# Patient Record
Sex: MALE | Race: WHITE | NOT HISPANIC OR LATINO | URBAN - METROPOLITAN AREA
[De-identification: names, ages, dates, MRNs, and addresses within clinical notes are randomized per-mention and may not be internally consistent; named-entity substitution may affect disease eponyms.]

---

## 2023-08-14 ENCOUNTER — HOSPITAL ENCOUNTER (OUTPATIENT)
Dept: DATA CONVERSION | Facility: HOSPITAL | Age: 28
Discharge: HOME | End: 2023-08-14

## 2023-08-14 DIAGNOSIS — Z00.00 ENCOUNTER FOR GENERAL ADULT MEDICAL EXAMINATION WITHOUT ABNORMAL FINDINGS: ICD-10-CM

## 2023-08-14 DIAGNOSIS — E66.9 OBESITY, UNSPECIFIED: ICD-10-CM

## 2023-08-14 LAB
ALBUMIN SERPL-MCNC: 4.4 GM/DL (ref 3.5–5)
ALBUMIN/GLOB SERPL: 1.5 RATIO (ref 1.5–3)
ALP BLD-CCNC: 51 U/L (ref 35–125)
ALT SERPL-CCNC: 69 U/L (ref 5–40)
ANION GAP SERPL CALCULATED.3IONS-SCNC: 12 MMOL/L (ref 0–19)
APPEARANCE PLAS: CLEAR
AST SERPL-CCNC: 38 U/L (ref 5–40)
BASOPHILS # BLD AUTO: 0.04 K/UL (ref 0–0.22)
BASOPHILS NFR BLD AUTO: 0.7 % (ref 0–1)
BILIRUB SERPL-MCNC: 0.7 MG/DL (ref 0.1–1.2)
BUN SERPL-MCNC: 17 MG/DL (ref 8–25)
BUN/CREAT SERPL: 15.5 RATIO (ref 8–21)
CALCIUM SERPL-MCNC: 9.8 MG/DL (ref 8.5–10.4)
CHLORIDE SERPL-SCNC: 105 MMOL/L (ref 97–107)
CHOLEST SERPL-MCNC: 210 MG/DL (ref 133–200)
CHOLEST/HDLC SERPL: 6.2 RATIO
CO2 SERPL-SCNC: 23 MMOL/L (ref 24–31)
COLOR SPUN FLD: YELLOW
CREAT SERPL-MCNC: 1.1 MG/DL (ref 0.4–1.6)
DEPRECATED RDW RBC AUTO: 43.2 FL (ref 37–54)
DIFFERENTIAL METHOD BLD: ABNORMAL
EOSINOPHIL # BLD AUTO: 0.15 K/UL (ref 0–0.45)
EOSINOPHIL NFR BLD: 2.5 % (ref 0–3)
ERYTHROCYTE [DISTWIDTH] IN BLOOD BY AUTOMATED COUNT: 13.1 % (ref 11.7–15)
FASTING STATUS PATIENT QL REPORTED: ABNORMAL
GFR SERPL CREATININE-BSD FRML MDRD: 93 ML/MIN/1.73 M2
GLOBULIN SER-MCNC: 3 G/DL (ref 1.9–3.7)
GLUCOSE SERPL-MCNC: 92 MG/DL (ref 65–99)
HCT VFR BLD AUTO: 43.6 % (ref 41–50)
HDLC SERPL-MCNC: 34 MG/DL
HGB BLD-MCNC: 14.1 GM/DL (ref 13.5–16.5)
IMM GRANULOCYTES # BLD AUTO: 0.1 K/UL (ref 0–0.1)
LDLC SERPL CALC-MCNC: 130 MG/DL (ref 65–130)
LYMPHOCYTES # BLD AUTO: 1.7 K/UL (ref 1.2–3.2)
LYMPHOCYTES NFR BLD MANUAL: 28.6 % (ref 20–40)
MCH RBC QN AUTO: 29.3 PG (ref 26–34)
MCHC RBC AUTO-ENTMCNC: 32.3 % (ref 31–37)
MCV RBC AUTO: 90.5 FL (ref 80–100)
MONOCYTES # BLD AUTO: 0.38 K/UL (ref 0–0.8)
MONOCYTES NFR BLD MANUAL: 6.4 % (ref 0–8)
NEUTROPHILS # BLD AUTO: 3.57 K/UL
NEUTROPHILS # BLD AUTO: 3.57 K/UL (ref 1.8–7.7)
NEUTROPHILS.IMMATURE NFR BLD: 1.7 % (ref 0–1)
NEUTS SEG NFR BLD: 60.1 % (ref 50–70)
NRBC BLD-RTO: 0 /100 WBC
PLATELET # BLD AUTO: 219 K/UL (ref 150–450)
PMV BLD AUTO: 12.5 CU (ref 7–12.6)
POTASSIUM SERPL-SCNC: 4.8 MMOL/L (ref 3.4–5.1)
PROT SERPL-MCNC: 7.4 G/DL (ref 5.9–7.9)
RBC # BLD AUTO: 4.82 M/UL (ref 4.5–5.5)
SODIUM SERPL-SCNC: 140 MMOL/L (ref 133–145)
TRIGL SERPL-MCNC: 230 MG/DL (ref 40–150)
TSH SERPL DL<=0.05 MIU/L-ACNC: 2.53 MIU/L (ref 0.27–4.2)
WBC # BLD AUTO: 5.9 K/UL (ref 4.5–11)

## 2023-09-16 VITALS
WEIGHT: 315 LBS | HEIGHT: 72 IN | SYSTOLIC BLOOD PRESSURE: 132 MMHG | HEART RATE: 82 BPM | TEMPERATURE: 98.1 F | RESPIRATION RATE: 18 BRPM | BODY MASS INDEX: 42.66 KG/M2 | OXYGEN SATURATION: 97 % | DIASTOLIC BLOOD PRESSURE: 84 MMHG

## 2024-08-26 ENCOUNTER — HOSPITAL ENCOUNTER (OUTPATIENT)
Dept: RADIOLOGY | Facility: EXTERNAL LOCATION | Age: 29
Discharge: HOME | End: 2024-08-26

## 2024-08-26 DIAGNOSIS — M79.672 LEFT FOOT PAIN: ICD-10-CM

## 2024-12-30 ENCOUNTER — OFFICE VISIT (OUTPATIENT)
Dept: URGENT CARE | Age: 29
End: 2024-12-30
Payer: COMMERCIAL

## 2024-12-30 ENCOUNTER — ANCILLARY PROCEDURE (OUTPATIENT)
Dept: URGENT CARE | Age: 29
End: 2024-12-30
Payer: COMMERCIAL

## 2024-12-30 VITALS
HEIGHT: 72 IN | SYSTOLIC BLOOD PRESSURE: 119 MMHG | OXYGEN SATURATION: 97 % | BODY MASS INDEX: 42.66 KG/M2 | WEIGHT: 315 LBS | TEMPERATURE: 98.5 F | DIASTOLIC BLOOD PRESSURE: 77 MMHG | HEART RATE: 110 BPM

## 2024-12-30 DIAGNOSIS — R68.89 FLU-LIKE SYMPTOMS: ICD-10-CM

## 2024-12-30 DIAGNOSIS — R05.9 COUGH, UNSPECIFIED TYPE: ICD-10-CM

## 2024-12-30 DIAGNOSIS — J02.9 ACUTE SORE THROAT: ICD-10-CM

## 2024-12-30 LAB
POC RAPID INFLUENZA A: NEGATIVE
POC RAPID INFLUENZA B: NEGATIVE
POC RAPID STREP: NEGATIVE
POC RSV PCR: NOT DETECTED
POC SARS-COV-2 AG BINAX: NORMAL

## 2024-12-30 PROCEDURE — 99213 OFFICE O/P EST LOW 20 MIN: CPT

## 2024-12-30 PROCEDURE — 71046 X-RAY EXAM CHEST 2 VIEWS: CPT

## 2024-12-30 PROCEDURE — 87807 RSV ASSAY W/OPTIC: CPT

## 2024-12-30 PROCEDURE — 3008F BODY MASS INDEX DOCD: CPT

## 2024-12-30 PROCEDURE — 87804 INFLUENZA ASSAY W/OPTIC: CPT

## 2024-12-30 PROCEDURE — 87811 SARS-COV-2 COVID19 W/OPTIC: CPT

## 2024-12-30 PROCEDURE — 87880 STREP A ASSAY W/OPTIC: CPT

## 2024-12-30 ASSESSMENT — ENCOUNTER SYMPTOMS
HEADACHES: 1
COUGH: 1
FEVER: 1
FATIGUE: 1

## 2024-12-30 NOTE — PROGRESS NOTES
Subjective   Patient ID: Jose J Hilliard is a 29 y.o. male. They present today with a chief complaint of Chest Pain, Headache, Cough (Symptoms started yesterday and are getting worse. Has baby at home and wants to make sure he's okay/Swabbing for strep, covid, and flu), and Fever.    History of Present Illness  Patient is a 29-year-old male with no pertinent medical history presents urgent with a complaint of flulike symptoms.  Specifically he endorses fever, chest congestion, productive cough, and bodies.  His symptoms started yesterday.  He has been using over-the-counter medication with some relief.  He denies any nausea, vomitting, chest pain or shortness of breath.  No other complaints mentioned at this time.       History provided by:  Patient  Chest Pain  Associated symptoms: cough, fatigue, fever and headache    Headache  Associated symptoms: congestion, cough, fatigue and fever    Cough  Associated symptoms include a fever and headaches.   Fever   Associated symptoms include congestion, coughing and headaches.       Past Medical History  Allergies as of 12/30/2024    (No Known Allergies)       (Not in a hospital admission)         No past medical history on file.    No past surgical history on file.         Review of Systems  Review of Systems   Constitutional:  Positive for fatigue and fever.   HENT:  Positive for congestion.    Respiratory:  Positive for cough.    Neurological:  Positive for headaches.                                  Objective    Vitals:    12/30/24 0818   BP: 119/77   Pulse: 110   Temp: 36.9 °C (98.5 °F)   TempSrc: Temporal   SpO2: 97%   Weight: 150 kg (330 lb)   Height: 1.829 m (6')     No LMP for male patient.    Physical Exam  Vitals and nursing note reviewed.   Constitutional:       General: He is not in acute distress.     Appearance: Normal appearance. He is not ill-appearing, toxic-appearing or diaphoretic.   HENT:      Head: Normocephalic and atraumatic.      Right Ear:  Tympanic membrane, ear canal and external ear normal.      Left Ear: Tympanic membrane, ear canal and external ear normal.      Nose: Congestion present.      Mouth/Throat:      Mouth: Mucous membranes are moist.      Pharynx: Posterior oropharyngeal erythema present. No oropharyngeal exudate.   Eyes:      Extraocular Movements: Extraocular movements intact.      Conjunctiva/sclera: Conjunctivae normal.      Pupils: Pupils are equal, round, and reactive to light.   Cardiovascular:      Rate and Rhythm: Normal rate and regular rhythm.      Pulses: Normal pulses.      Heart sounds: Normal heart sounds.   Pulmonary:      Effort: Pulmonary effort is normal. No respiratory distress.      Breath sounds: Normal breath sounds. No stridor. No wheezing, rhonchi or rales.   Chest:      Chest wall: No tenderness.   Musculoskeletal:         General: Normal range of motion.      Cervical back: Normal range of motion and neck supple.   Skin:     General: Skin is warm and dry.      Capillary Refill: Capillary refill takes less than 2 seconds.   Neurological:      General: No focal deficit present.      Mental Status: He is alert and oriented to person, place, and time.   Psychiatric:         Mood and Affect: Mood normal.         Behavior: Behavior normal.         Procedures      Assessment/Plan   Allergies, medications, history, and pertinent labs/EKGs/Imaging reviewed by MEGAN Mota.     Medical Decision Making  Patient is well appearing, afebrile, non toxic, not hypoxic, and appropriate for outpatient treatment and management at time of evaluation.   Patient presents with flulike symptoms as described above.    Differential includes but not limited to: COVID, influenza, pneumonia, URI, strep throat, other.      Rapid flu, strep, RSV and COVID are negative.  Chest x-ray ordered.  Image independently reviewed by myself and interpreted as no acute cardiopulmonary process.     At this time, I feel symptoms are most  likely secondary to an upper respiratory infection, viral in nature.  Recommended continued use of over-the-counter medication as needed for relief and close follow-up with PCP.  ER cautions and red flags discussed.  Patient is in agreement with this plan.  He was discharged in stable condition.  All questions and concerns addressed.        Orders and Diagnoses  Diagnoses and all orders for this visit:  Cough, unspecified type  Flu-like symptoms  Acute sore throat      Medical Admin Record    === 12/30/24 ===    XR CHEST 2 VIEWS    - Impression -  No acute cardiopulmonary process.    MACRO:  None    Signed by: Elena Herzog 12/30/2024 10:42 AM  Dictation workstation:   [id number]    Follow Up Instructions  No follow-ups on file.    Patient disposition: Home    Electronically signed by MEGAN Mota  8:47 AM

## 2024-12-30 NOTE — PATIENT INSTRUCTIONS
You were seen at Urgent Care today and diagnosed with a viral upper respiratory infection. Please treat as discussed.  Monitor for red flags which we spoke about, If your symptoms change, worsen or become concerning in any way, please go to the emergency room immediately, otherwise you can followup with your PCP in 2-3 days as needed